# Patient Record
Sex: FEMALE | Race: BLACK OR AFRICAN AMERICAN | Employment: OTHER | URBAN - METROPOLITAN AREA
[De-identification: names, ages, dates, MRNs, and addresses within clinical notes are randomized per-mention and may not be internally consistent; named-entity substitution may affect disease eponyms.]

---

## 2017-02-21 ENCOUNTER — HOSPITAL ENCOUNTER (OUTPATIENT)
Dept: LAB | Age: 37
Discharge: HOME | End: 2017-02-21
Payer: SELF-PAY

## 2017-02-21 ENCOUNTER — OFFICE VISIT (OUTPATIENT)
Dept: SURGERY | Age: 37
End: 2017-02-21

## 2017-02-21 VITALS
WEIGHT: 127 LBS | TEMPERATURE: 98 F | HEIGHT: 62 IN | RESPIRATION RATE: 16 BRPM | OXYGEN SATURATION: 100 % | SYSTOLIC BLOOD PRESSURE: 116 MMHG | HEART RATE: 92 BPM | BODY MASS INDEX: 23.37 KG/M2 | DIASTOLIC BLOOD PRESSURE: 81 MMHG

## 2017-02-21 DIAGNOSIS — N76.6 VULVAR ULCERATION: ICD-10-CM

## 2017-02-21 DIAGNOSIS — B96.89 BACTERIAL VAGINAL INFECTION: ICD-10-CM

## 2017-02-21 DIAGNOSIS — N76.0 BACTERIAL VAGINAL INFECTION: ICD-10-CM

## 2017-02-21 DIAGNOSIS — Z97.5 IUD (INTRAUTERINE DEVICE) IN PLACE: ICD-10-CM

## 2017-02-21 DIAGNOSIS — Z01.419 ENCOUNTER FOR GYNECOLOGICAL EXAMINATION WITHOUT ABNORMAL FINDING: Primary | ICD-10-CM

## 2017-02-21 LAB — WET MOUNT POCT, WMPOCT: ABNORMAL

## 2017-02-21 PROCEDURE — 88142 CYTOPATH C/V THIN LAYER: CPT | Performed by: OBSTETRICS & GYNECOLOGY

## 2017-02-21 RX ORDER — FLUCONAZOLE 150 MG/1
TABLET ORAL
Qty: 2 TAB | Refills: 1 | Status: SHIPPED | OUTPATIENT
Start: 2017-02-21

## 2017-02-21 RX ORDER — METRONIDAZOLE 500 MG/1
500 TABLET ORAL 2 TIMES DAILY WITH MEALS
Qty: 14 TAB | Refills: 0 | Status: SHIPPED | OUTPATIENT
Start: 2017-02-21 | End: 2017-02-22 | Stop reason: SINTOL

## 2017-02-21 NOTE — MR AVS SNAPSHOT
Visit Information Date & Time Provider Department Dept. Phone Encounter #  
 2/21/2017  3:00 PM Judith Watson, [address] [facility] [phone] [id number] Follow-up Instructions Return in about 2 weeks (around 3/7/2017), or if symptoms worsen or fail to improve. Upcoming Health Maintenance Date Due DTaP/Tdap/Td series (1 - Tdap) 11/11/2003 PAP AKA CERVICAL CYTOLOGY 10/4/2014 INFLUENZA AGE 9 TO ADULT 8/1/2016 Allergies as of 2/21/2017  Review Complete On: 2/21/2017 By: Judith Watson MD  
 No Known Allergies Current Immunizations  Never Reviewed No immunizations on file. Not reviewed this visit You Were Diagnosed With   
  
 Codes Comments Encounter for gynecological examination without abnormal finding    -  Primary ICD-10-CM: O63.130 ICD-9-CM: V72.31 Vulvar ulceration     ICD-10-CM: N76.6 ICD-9-CM: 616.50 Bacterial vaginal infection     ICD-10-CM: N76.0, B96.89 
ICD-9-CM: 616.10, 041.9 Vitals BP Pulse Temp Resp Height(growth percentile) Weight(growth percentile) 116/81 92 98 °F (36.7 °C) (Oral) 16 5' 2\" (1.575 m) 127 lb (57.6 kg) LMP SpO2 BMI OB Status Smoking Status 02/02/2017 100% 23.23 kg/m2 Recent pregnancy Never Smoker Vitals History BMI and BSA Data Body Mass Index Body Surface Area  
 23.23 kg/m 2 1.59 m 2 Preferred Pharmacy Pharmacy Name Phone [address] [facility], [facility] [address] ([address] [phone] Your Updated Medication List  
  
   
This list is accurate as of: 2/21/17  4:24 PM.  Always use your most recent med list.  
  
  
  
  
 fluconazole 150 mg tablet Commonly known as:  DIFLUCAN Take 1st pill on day one then 2nd pill on day 3  
  
 ibuprofen 600 mg tablet Commonly known as:  MOTRIN Take 1 tablet by mouth every eight (8) hours as needed for Pain. metroNIDAZOLE 500 mg tablet Commonly known as:  FLAGYL Take 1 Tab by mouth two (2) times daily (with meals) for 7 days. oxyCODONE-acetaminophen 5-325 mg per tablet Commonly known as:  PERCOCET Take 1 tablet by mouth every four (4) hours as needed for Pain. Prescriptions Sent to Pharmacy Refills  
 fluconazole (DIFLUCAN) 150 mg tablet 1 Sig: Take 1st pill on day one then 2nd pill on day 3 Class: Normal  
 Pharmacy: [address] AT [address] [facility] Ph #: [phone]  
 metroNIDAZOLE (FLAGYL) 500 mg tablet 0 Sig: Take 1 Tab by mouth two (2) times daily (with meals) for 7 days. Class: Normal  
 Pharmacy: [facility] [address] [facility] [address] Ph #: [phone] Route: Oral  
  
We Performed the Following AMB POC SMEAR, STAIN & [place] MOUNT [id number] CPT(R)] CHLAMYDIA/NEISSERIA BY ARYAN W/REFLEX CONFIRM [[id number] Custom] HERPES SIMPLEX VIRUS (HSV) ARYAN [[id number] Custom] PAP, LB, RFX HPV FIKXB([id number]) [id number] CPT(R)] PAP, LB, RFX HPV [id number]([id number]) [id number] CPT(R)] Follow-up Instructions Return in about 2 weeks (around 3/7/2017), or if symptoms worsen or fail to improve. Introducing [facility]! Dear Chhaya Garnett: Thank you for requesting a [employer] account. Our records indicate that you already have an active [employer] account. You can access your account anytime at [email or web address] Did you know that you can access your hospital and ER discharge instructions at any time in [employer]? You can also review all of your test results from your hospital stay or ER visit. Additional Information If you have questions, please visit the Frequently Asked Questions section of the [employer] website at [email or web address] Remember, [employer] is NOT to be used for urgent needs. For medical emergencies, dial 911. Now available from your iPhone and Android! Please provide this summary of care documentation to your next provider. Your primary care clinician is listed as NONE. If you have any questions after today's visit, please call [phone].

## 2017-02-21 NOTE — PROGRESS NOTES
SUBJECTIVE: Sayra Haddad is a 31 y.o. female [id number] (Abortions 0, Miscarriages 2), who presents with desire for annual well woman exam. Patient's last menstrual period was 2017. Vaginal irritation after sex that has peristed and now has a vaginal discharge with slight odor and slight itching and lasted for 1 week. [place]ton when she urinates and suspects herpes lesions. No Known Allergies     Past Medical History   Diagnosis Date    Anemia      taking iron    Arrhythmia      occ palpitations    Miscarriage        Past Surgical History   Procedure Laterality Date    Hx wisdom teeth extraction      Hx heent       wisdom teeth       Family History   Problem Relation Age of Onset    Hypertension Father        Social History     Social History    Marital status: SINGLE     Spouse name: N/A    Number of children: N/A    Years of education: N/A     Occupational History    Not on file. Social History Main Topics    Smoking status: Never Smoker    Smokeless tobacco: Never Used    Alcohol use No    Drug use: No    Sexual activity: Yes     Birth control/ protection: None     Other Topics Concern    Not on file     Social History Narrative       Current Outpatient Prescriptions   Medication Sig Dispense Refill    oxycodone-acetaminophen (PERCOCET) 5-325 mg per tablet Take 1 tablet by mouth every four (4) hours as needed for Pain. 20 tablet 0    ibuprofen (MOTRIN) 600 mg tablet Take 1 tablet by mouth every eight (8) hours as needed for Pain. 36 tablet 2       Review of Systems:   Constitutional: No weight change, chills or fever, anorexia, weakness or sleep disturbance . Cardiovascular: No chest pain, shortness of breath, or palpitations . Respiratory: No cough, shortness of breath, hemoptysis, or orthopnea . Neurologic: No syncope, headaches or seizures . Hematologic: No easy bruising or unusual bleeding . Psychiatric: No insomnia, confusion, depression, or anxiety .  GI:No nausea and vomiting, diarrhea or constipation  . : See HPI . Musculoskeletal: No joint pain or muscle pain . Endocrine: No polydipsia, polyuria, cold intolerance, excessive fatigue, or sleep disturbance . Integumentary: No breast pain, lumps, nipple discharge, or axillary lumps . Objective:     Visit Vitals    /81    Pulse 92    Temp 98 °F (36.7 °C) (Oral)    Resp 16    Ht 5' 2\" (1.575 m)    Wt 127 lb (57.6 kg)    LMP 02/02/2017    SpO2 100%    BMI 23.23 kg/m2       General:  alert, cooperative, no distress, appears stated age   Skin:  no rash or abnormalities   Eyes: negative   Mouth: MMM no lesions   Lymph Nodes:  Cervical, supraclavicular, and axillary nodes normal.   Breast Exam: normal appearance, no masses or tenderness    Lungs:  clear to auscultation bilaterally   Heart:  regular rate and rhythm   Abdomen: soft, non-tender. Bowel sounds normal. No masses,  no organomegaly   Back:  Costovertebral angle tenderness absent   Genitourinary: Pelvic exam: VULVA: vulvar excoriation at perineum, VAGINA: vaginal discharge - white, WET MOUNT done - results: KOH done, clue cells, excessive bacteria, CERVIX: normal appearing cervix without discharge or lesions, IUD String visible, DNA probe for chlamydia and GC obtained, UTERUS: uterus is normal size, shape, consistency and nontender, ADNEXA: normal adnexa in size, nontender and no masses    Extremities:  extremities normal, atraumatic, no cyanosis or edema   Neurologic:  sensation grossly intact. Psychiatric:  non focal     ASSESSMENT:      ICD-10-CM ICD-9-CM    1. Encounter for gynecological examination without abnormal finding Z01.419 V72.31 CHLAMYDIA/NEISSERIA BY ARYAN W/REFLEX CONFIRM      AMB POC SMEAR, STAIN & INTERPRET, WET MOUNT      PAP, LB, RFX HPV [id number]([id number])      CANCELED: PAP, LB, RFX HPV [id number])   2. Vulvar ulceration N76.6 616.50 HERPES SIMPLEX VIRUS (HSV) ARYAN   3.  Bacterial vaginal infection N76.0 616.10 fluconazole (DIFLUCAN) 150 mg tablet B96.89 041.9 metroNIDAZOLE (FLAGYL) 500 mg tablet   4. IUD (intrauterine device) in place Z97.5 V45.51      Call back in one week for Herpes culture and GC/CT culture results. Follow-up Disposition:  Return in about 1 year (around 2/21/2018), or if symptoms worsen or fail to improve.

## 2017-02-22 RX ORDER — METRONIDAZOLE 7.5 MG/G
1 GEL VAGINAL 2 TIMES DAILY
Qty: 375 MG | Refills: 0 | Status: SHIPPED | OUTPATIENT
Start: 2017-02-22 | End: 2017-02-27

## 2017-02-24 LAB
C TRACH RRNA SPEC QL NAA+PROBE: NEGATIVE
HSV1 DNA SPEC QL NAA+PROBE: NEGATIVE
HSV2 DNA SPEC QL NAA+PROBE: NEGATIVE
N GONORRHOEA RRNA SPEC QL NAA+PROBE: NEGATIVE